# Patient Record
Sex: MALE | Race: WHITE | NOT HISPANIC OR LATINO
[De-identification: names, ages, dates, MRNs, and addresses within clinical notes are randomized per-mention and may not be internally consistent; named-entity substitution may affect disease eponyms.]

---

## 2017-05-11 ENCOUNTER — NON-APPOINTMENT (OUTPATIENT)
Age: 67
End: 2017-05-11

## 2017-05-11 ENCOUNTER — APPOINTMENT (OUTPATIENT)
Dept: CARDIOLOGY | Facility: CLINIC | Age: 67
End: 2017-05-11

## 2017-05-11 VITALS
WEIGHT: 157 LBS | BODY MASS INDEX: 24.64 KG/M2 | DIASTOLIC BLOOD PRESSURE: 86 MMHG | OXYGEN SATURATION: 99 % | HEIGHT: 67 IN | SYSTOLIC BLOOD PRESSURE: 136 MMHG | HEART RATE: 58 BPM

## 2017-05-12 ENCOUNTER — NON-APPOINTMENT (OUTPATIENT)
Age: 67
End: 2017-05-12

## 2017-06-28 ENCOUNTER — MEDICATION RENEWAL (OUTPATIENT)
Age: 67
End: 2017-06-28

## 2017-08-09 ENCOUNTER — MEDICATION RENEWAL (OUTPATIENT)
Age: 67
End: 2017-08-09

## 2017-08-09 ENCOUNTER — RX RENEWAL (OUTPATIENT)
Age: 67
End: 2017-08-09

## 2017-10-10 ENCOUNTER — MEDICATION RENEWAL (OUTPATIENT)
Age: 67
End: 2017-10-10

## 2017-11-10 ENCOUNTER — MEDICATION RENEWAL (OUTPATIENT)
Age: 67
End: 2017-11-10

## 2017-11-14 ENCOUNTER — RX RENEWAL (OUTPATIENT)
Age: 67
End: 2017-11-14

## 2017-12-01 ENCOUNTER — MEDICATION RENEWAL (OUTPATIENT)
Age: 67
End: 2017-12-01

## 2017-12-20 ENCOUNTER — MEDICATION RENEWAL (OUTPATIENT)
Age: 67
End: 2017-12-20

## 2018-02-02 ENCOUNTER — MEDICATION RENEWAL (OUTPATIENT)
Age: 68
End: 2018-02-02

## 2018-02-12 ENCOUNTER — MEDICATION RENEWAL (OUTPATIENT)
Age: 68
End: 2018-02-12

## 2018-03-05 ENCOUNTER — MEDICATION RENEWAL (OUTPATIENT)
Age: 68
End: 2018-03-05

## 2018-04-03 ENCOUNTER — MOBILE ON CALL (OUTPATIENT)
Age: 68
End: 2018-04-03

## 2018-04-03 RX ORDER — ZOLPIDEM TARTRATE 10 MG/1
10 TABLET ORAL
Qty: 30 | Refills: 0 | Status: COMPLETED | COMMUNITY
Start: 2017-10-10 | End: 2018-04-03

## 2018-05-03 ENCOUNTER — APPOINTMENT (OUTPATIENT)
Dept: CARDIOLOGY | Facility: CLINIC | Age: 68
End: 2018-05-03
Payer: MEDICARE

## 2018-05-03 ENCOUNTER — NON-APPOINTMENT (OUTPATIENT)
Age: 68
End: 2018-05-03

## 2018-05-03 VITALS
BODY MASS INDEX: 24.64 KG/M2 | HEIGHT: 67 IN | OXYGEN SATURATION: 100 % | WEIGHT: 157 LBS | SYSTOLIC BLOOD PRESSURE: 134 MMHG | DIASTOLIC BLOOD PRESSURE: 82 MMHG | HEART RATE: 65 BPM

## 2018-05-03 PROCEDURE — 99213 OFFICE O/P EST LOW 20 MIN: CPT

## 2018-05-07 ENCOUNTER — MEDICATION RENEWAL (OUTPATIENT)
Age: 68
End: 2018-05-07

## 2018-05-10 ENCOUNTER — MEDICATION RENEWAL (OUTPATIENT)
Age: 68
End: 2018-05-10

## 2018-06-05 ENCOUNTER — MEDICATION RENEWAL (OUTPATIENT)
Age: 68
End: 2018-06-05

## 2018-07-09 ENCOUNTER — MEDICATION RENEWAL (OUTPATIENT)
Age: 68
End: 2018-07-09

## 2018-08-03 ENCOUNTER — MEDICATION RENEWAL (OUTPATIENT)
Age: 68
End: 2018-08-03

## 2018-09-07 ENCOUNTER — MEDICATION RENEWAL (OUTPATIENT)
Age: 68
End: 2018-09-07

## 2018-09-17 ENCOUNTER — RX RENEWAL (OUTPATIENT)
Age: 68
End: 2018-09-17

## 2018-10-02 ENCOUNTER — RX RENEWAL (OUTPATIENT)
Age: 68
End: 2018-10-02

## 2018-10-22 ENCOUNTER — MEDICATION RENEWAL (OUTPATIENT)
Age: 68
End: 2018-10-22

## 2018-10-25 ENCOUNTER — MEDICATION RENEWAL (OUTPATIENT)
Age: 68
End: 2018-10-25

## 2018-12-03 ENCOUNTER — MEDICATION RENEWAL (OUTPATIENT)
Age: 68
End: 2018-12-03

## 2019-01-02 ENCOUNTER — RX RENEWAL (OUTPATIENT)
Age: 69
End: 2019-01-02

## 2019-02-22 ENCOUNTER — MEDICATION RENEWAL (OUTPATIENT)
Age: 69
End: 2019-02-22

## 2019-02-26 ENCOUNTER — MEDICATION RENEWAL (OUTPATIENT)
Age: 69
End: 2019-02-26

## 2019-04-12 ENCOUNTER — MEDICATION RENEWAL (OUTPATIENT)
Age: 69
End: 2019-04-12

## 2019-05-15 ENCOUNTER — RX RENEWAL (OUTPATIENT)
Age: 69
End: 2019-05-15

## 2019-06-10 ENCOUNTER — MEDICATION RENEWAL (OUTPATIENT)
Age: 69
End: 2019-06-10

## 2019-08-05 ENCOUNTER — MEDICATION RENEWAL (OUTPATIENT)
Age: 69
End: 2019-08-05

## 2019-08-15 ENCOUNTER — RX RENEWAL (OUTPATIENT)
Age: 69
End: 2019-08-15

## 2019-10-11 ENCOUNTER — MEDICATION RENEWAL (OUTPATIENT)
Age: 69
End: 2019-10-11

## 2019-11-06 ENCOUNTER — APPOINTMENT (OUTPATIENT)
Dept: CARDIOLOGY | Facility: CLINIC | Age: 69
End: 2019-11-06
Payer: MEDICARE

## 2019-11-06 ENCOUNTER — NON-APPOINTMENT (OUTPATIENT)
Age: 69
End: 2019-11-06

## 2019-11-06 VITALS
HEIGHT: 67 IN | SYSTOLIC BLOOD PRESSURE: 126 MMHG | BODY MASS INDEX: 25.9 KG/M2 | WEIGHT: 165 LBS | DIASTOLIC BLOOD PRESSURE: 80 MMHG | HEART RATE: 62 BPM | OXYGEN SATURATION: 100 %

## 2019-11-06 PROCEDURE — G0439: CPT

## 2019-11-21 ENCOUNTER — MEDICATION RENEWAL (OUTPATIENT)
Age: 69
End: 2019-11-21

## 2019-12-17 ENCOUNTER — APPOINTMENT (OUTPATIENT)
Dept: CARDIOLOGY | Facility: CLINIC | Age: 69
End: 2019-12-17

## 2020-01-02 ENCOUNTER — MEDICATION RENEWAL (OUTPATIENT)
Age: 70
End: 2020-01-02

## 2020-06-17 DIAGNOSIS — M85.80 OTHER SPECIFIED DISORDERS OF BONE DENSITY AND STRUCTURE, UNSPECIFIED SITE: ICD-10-CM

## 2020-06-17 RX ORDER — MULTIVIT-MIN/FOLIC/VIT K/LYCOP 400-300MCG
25 MCG TABLET ORAL DAILY
Qty: 30 | Refills: 0 | Status: ACTIVE | COMMUNITY
Start: 2020-06-17 | End: 1900-01-01

## 2020-08-13 ENCOUNTER — RX RENEWAL (OUTPATIENT)
Age: 70
End: 2020-08-13

## 2021-01-18 ENCOUNTER — APPOINTMENT (OUTPATIENT)
Dept: CARDIOLOGY | Facility: CLINIC | Age: 71
End: 2021-01-18
Payer: MEDICARE

## 2021-01-18 ENCOUNTER — NON-APPOINTMENT (OUTPATIENT)
Age: 71
End: 2021-01-18

## 2021-01-18 VITALS
DIASTOLIC BLOOD PRESSURE: 86 MMHG | HEART RATE: 57 BPM | BODY MASS INDEX: 25.58 KG/M2 | TEMPERATURE: 97.2 F | SYSTOLIC BLOOD PRESSURE: 140 MMHG | WEIGHT: 163 LBS | HEIGHT: 67 IN | OXYGEN SATURATION: 100 %

## 2021-01-18 DIAGNOSIS — F41.9 ANXIETY DISORDER, UNSPECIFIED: ICD-10-CM

## 2021-01-18 PROCEDURE — G0439: CPT

## 2021-01-18 PROCEDURE — 99214 OFFICE O/P EST MOD 30 MIN: CPT

## 2021-01-18 PROCEDURE — 36415 COLL VENOUS BLD VENIPUNCTURE: CPT

## 2021-01-18 PROCEDURE — 93000 ELECTROCARDIOGRAM COMPLETE: CPT

## 2021-01-18 PROCEDURE — 99397 PER PM REEVAL EST PAT 65+ YR: CPT

## 2021-01-18 RX ORDER — KETOCONAZOLE 20.5 MG/ML
2 SHAMPOO, SUSPENSION TOPICAL
Qty: 120 | Refills: 0 | Status: COMPLETED | COMMUNITY
Start: 2020-09-29

## 2021-02-12 ENCOUNTER — RX RENEWAL (OUTPATIENT)
Age: 71
End: 2021-02-12

## 2021-08-12 ENCOUNTER — RX RENEWAL (OUTPATIENT)
Age: 71
End: 2021-08-12

## 2021-09-09 DIAGNOSIS — R10.2 PELVIC AND PERINEAL PAIN: ICD-10-CM

## 2021-09-20 ENCOUNTER — APPOINTMENT (OUTPATIENT)
Dept: SURGERY | Facility: CLINIC | Age: 71
End: 2021-09-20
Payer: MEDICARE

## 2021-09-20 VITALS
TEMPERATURE: 97.2 F | SYSTOLIC BLOOD PRESSURE: 167 MMHG | DIASTOLIC BLOOD PRESSURE: 94 MMHG | HEART RATE: 54 BPM | WEIGHT: 165 LBS | BODY MASS INDEX: 25.9 KG/M2 | HEIGHT: 67 IN

## 2021-09-20 DIAGNOSIS — K40.90 UNILATERAL INGUINAL HERNIA, W/OUT OBSTRUCTION OR GANGRENE, NOT SPECIFIED AS RECURRENT: ICD-10-CM

## 2021-09-20 PROCEDURE — 99203 OFFICE O/P NEW LOW 30 MIN: CPT

## 2021-09-20 NOTE — PHYSICAL EXAM
[Respiratory Effort] : normal respiratory effort [Alert] : alert [Oriented to Person] : oriented to person [Oriented to Place] : oriented to place [Oriented to Time] : oriented to time [Calm] : calm [JVD] : no jugular venous distention  [Abdominal Masses] : No abdominal masses [Abdomen Tenderness] : ~T ~M No abdominal tenderness [de-identified] : PROSPER ANDRADE NAD [de-identified] : EOMI [de-identified] : soft NT ND + reducible LIH.

## 2021-09-20 NOTE — HISTORY OF PRESENT ILLNESS
[de-identified] : 72 yo male with h/o open RIH repair by Dr. Hogan 20 + years ago presents today with 1 month h/o intermittent left groin burning and discomfort. He states he notices increased burning after long walks with his dog. He denies any nausea or vomiting.

## 2021-10-07 ENCOUNTER — OUTPATIENT (OUTPATIENT)
Dept: OUTPATIENT SERVICES | Facility: HOSPITAL | Age: 71
LOS: 1 days | Discharge: HOME | End: 2021-10-07
Payer: MEDICARE

## 2021-10-07 VITALS
TEMPERATURE: 98 F | WEIGHT: 162.48 LBS | SYSTOLIC BLOOD PRESSURE: 150 MMHG | DIASTOLIC BLOOD PRESSURE: 60 MMHG | HEIGHT: 67 IN | HEART RATE: 55 BPM

## 2021-10-07 DIAGNOSIS — K40.90 UNILATERAL INGUINAL HERNIA, WITHOUT OBSTRUCTION OR GANGRENE, NOT SPECIFIED AS RECURRENT: Chronic | ICD-10-CM

## 2021-10-07 DIAGNOSIS — K40.90 UNILATERAL INGUINAL HERNIA, WITHOUT OBSTRUCTION OR GANGRENE, NOT SPECIFIED AS RECURRENT: ICD-10-CM

## 2021-10-07 DIAGNOSIS — Z01.818 ENCOUNTER FOR OTHER PREPROCEDURAL EXAMINATION: ICD-10-CM

## 2021-10-07 LAB
ANION GAP SERPL CALC-SCNC: 4 MMOL/L — LOW (ref 5–17)
BUN SERPL-MCNC: 18 MG/DL — SIGNIFICANT CHANGE UP (ref 7–23)
CALCIUM SERPL-MCNC: 9.9 MG/DL — SIGNIFICANT CHANGE UP (ref 8.5–10.1)
CHLORIDE SERPL-SCNC: 106 MMOL/L — SIGNIFICANT CHANGE UP (ref 96–108)
CO2 SERPL-SCNC: 29 MMOL/L — SIGNIFICANT CHANGE UP (ref 22–31)
CREAT SERPL-MCNC: 0.75 MG/DL — SIGNIFICANT CHANGE UP (ref 0.5–1.3)
GLUCOSE SERPL-MCNC: 83 MG/DL — SIGNIFICANT CHANGE UP (ref 70–99)
HCT VFR BLD CALC: 41.5 % — SIGNIFICANT CHANGE UP (ref 39–50)
HGB BLD-MCNC: 13.5 G/DL — SIGNIFICANT CHANGE UP (ref 13–17)
MCHC RBC-ENTMCNC: 29.6 PG — SIGNIFICANT CHANGE UP (ref 27–34)
MCHC RBC-ENTMCNC: 32.5 GM/DL — SIGNIFICANT CHANGE UP (ref 32–36)
MCV RBC AUTO: 91 FL — SIGNIFICANT CHANGE UP (ref 80–100)
NRBC # BLD: 0 /100 WBCS — SIGNIFICANT CHANGE UP (ref 0–0)
PLATELET # BLD AUTO: 225 K/UL — SIGNIFICANT CHANGE UP (ref 150–400)
POTASSIUM SERPL-MCNC: 4.4 MMOL/L — SIGNIFICANT CHANGE UP (ref 3.5–5.3)
POTASSIUM SERPL-SCNC: 4.4 MMOL/L — SIGNIFICANT CHANGE UP (ref 3.5–5.3)
RBC # BLD: 4.56 M/UL — SIGNIFICANT CHANGE UP (ref 4.2–5.8)
RBC # FLD: 12.6 % — SIGNIFICANT CHANGE UP (ref 10.3–14.5)
SODIUM SERPL-SCNC: 139 MMOL/L — SIGNIFICANT CHANGE UP (ref 135–145)
WBC # BLD: 6.5 K/UL — SIGNIFICANT CHANGE UP (ref 3.8–10.5)
WBC # FLD AUTO: 6.5 K/UL — SIGNIFICANT CHANGE UP (ref 3.8–10.5)

## 2021-10-07 PROCEDURE — 93010 ELECTROCARDIOGRAM REPORT: CPT

## 2021-10-07 NOTE — H&P PST ADULT - NSANTHOSAYNRD_GEN_A_CORE
No. MICHEL screening performed.  STOP BANG Legend: 0-2 = LOW Risk; 3-4 = INTERMEDIATE Risk; 5-8 = HIGH Risk

## 2021-10-07 NOTE — H&P PST ADULT - ASSESSMENT
71 year old male with a past medical history of HLD c/o discomfort to left groin that worsens at night.  He is scheduled for a laparoscopic left  inguinal hernia repair on 10/14/2021.    CAPRINI SCORE [CLOT]    AGE RELATED RISK FACTORS                                                       MOBILITY RELATED FACTORS  [ ] Age 41-60 years                                            (1 Point)                  [ ] Bed rest                                                        (1 Point)  [x ] Age: 61-74 years                                           (2 Points)                 [ ] Plaster cast                                                   (2 Points)  [ ] Age= 75 years                                              (3 Points)                 [ ] Bed bound for more than 72 hours                 (2 Points)    DISEASE RELATED RISK FACTORS                                               GENDER SPECIFIC FACTORS  [ ] Edema in the lower extremities                       (1 Point)                  [ ] Pregnancy                                                     (1 Point)  [ ] Varicose veins                                               (1 Point)                  [ ] Post-partum < 6 weeks                                   (1 Point)             [ ] BMI > 25 Kg/m2                                            (1 Point)                  [ ] Hormonal therapy  or oral contraception          (1 Point)                 [ ] Sepsis (in the previous month)                        (1 Point)                  [ ] History of pregnancy complications                 (1 point)  [ ] Pneumonia or serious lung disease                                               [ ] Unexplained or recurrent                     (1 Point)           (in the previous month)                               (1 Point)  [ ] Abnormal pulmonary function test                     (1 Point)                 SURGERY RELATED RISK FACTORS  [ ] Acute myocardial infarction                              (1 Point)                 [ ]  Section                                             (1 Point)  [ ] Congestive heart failure (in the previous month)  (1 Point)               [ ] Minor surgery                                                  (1 Point)   [ ] Inflammatory bowel disease                             (1 Point)                 [ ] Arthroscopic surgery                                        (2 Points)  [ ] Central venous access                                      (2 Points)                [x ] General surgery lasting more than 45 minutes   (2 Points)       [ ] Stroke (in the previous month)                          (5 Points)               [ ] Elective arthroplasty                                         (5 Points)                                                                                                                                               HEMATOLOGY RELATED FACTORS                                                 TRAUMA RELATED RISK FACTORS  [ ] Prior episodes of VTE                                     (3 Points)                [ ] Fracture of the hip, pelvis, or leg                       (5 Points)  [ ] Positive family history for VTE                         (3 Points)                 [ ] Acute spinal cord injury (in the previous month)  (5 Points)  [ ] Prothrombin 25578 A                                     (3 Points)                 [ ] Paralysis  (less than 1 month)                             (5 Points)  [ ] Factor V Leiden                                             (3 Points)                  [ ] Multiple Trauma within 1 month                        (5 Points)  [ ] Lupus anticoagulants                                     (3 Points)                                                           [ ] Anticardiolipin antibodies                               (3 Points)                                                       [ ] High homocysteine in the blood                      (3 Points)                                             [ ] Other congenital or acquired thrombophilia      (3 Points)                                                [ ] Heparin induced thrombocytopenia                  (3 Points)                                          Total Score [    4      ]    Caprini Score 0 - 2:  Low Risk, No VTE Prophylaxis required for most patients, encourage ambulation  Caprini Score 3 - 6:  At Risk, pharmacologic VTE prophylaxis is indicated for most patients (in the absence of a contraindication)  Caprini Score Greater than or = 7:  High Risk, pharmacologic VTE prophylaxis is indicated for most patients (in the absence of a contraindication)

## 2021-10-07 NOTE — H&P PST ADULT - HISTORY OF PRESENT ILLNESS
71 year old male with a past medical history of HLD c/o discomfort to left groin that worsens at night.  He is scheduled for a laparoscopic left i inguinal hernia repair on 10/14/2021.    He denies fever, cough, SOB, recent travels, and sick contacts.

## 2021-10-11 ENCOUNTER — TRANSCRIPTION ENCOUNTER (OUTPATIENT)
Age: 71
End: 2021-10-11

## 2021-10-13 ENCOUNTER — TRANSCRIPTION ENCOUNTER (OUTPATIENT)
Age: 71
End: 2021-10-13

## 2021-10-14 ENCOUNTER — APPOINTMENT (OUTPATIENT)
Dept: SURGERY | Facility: HOSPITAL | Age: 71
End: 2021-10-14

## 2021-10-14 ENCOUNTER — OUTPATIENT (OUTPATIENT)
Dept: OUTPATIENT SERVICES | Facility: HOSPITAL | Age: 71
LOS: 1 days | Discharge: HOME | End: 2021-10-14
Payer: MEDICARE

## 2021-10-14 VITALS
HEART RATE: 88 BPM | WEIGHT: 162.48 LBS | SYSTOLIC BLOOD PRESSURE: 150 MMHG | RESPIRATION RATE: 14 BRPM | OXYGEN SATURATION: 97 % | TEMPERATURE: 98 F | HEIGHT: 67 IN | DIASTOLIC BLOOD PRESSURE: 87 MMHG

## 2021-10-14 VITALS
TEMPERATURE: 98 F | SYSTOLIC BLOOD PRESSURE: 139 MMHG | OXYGEN SATURATION: 98 % | RESPIRATION RATE: 16 BRPM | HEART RATE: 52 BPM | DIASTOLIC BLOOD PRESSURE: 71 MMHG

## 2021-10-14 DIAGNOSIS — K40.90 UNILATERAL INGUINAL HERNIA, WITHOUT OBSTRUCTION OR GANGRENE, NOT SPECIFIED AS RECURRENT: Chronic | ICD-10-CM

## 2021-10-14 PROCEDURE — 49650 LAP ING HERNIA REPAIR INIT: CPT | Mod: AS

## 2021-10-14 PROCEDURE — 49650 LAP ING HERNIA REPAIR INIT: CPT

## 2021-10-14 RX ORDER — FENTANYL CITRATE 50 UG/ML
25 INJECTION INTRAVENOUS
Refills: 0 | Status: DISCONTINUED | OUTPATIENT
Start: 2021-10-14 | End: 2021-10-14

## 2021-10-14 RX ORDER — SODIUM CHLORIDE 9 MG/ML
3 INJECTION INTRAMUSCULAR; INTRAVENOUS; SUBCUTANEOUS EVERY 8 HOURS
Refills: 0 | Status: DISCONTINUED | OUTPATIENT
Start: 2021-10-14 | End: 2021-10-14

## 2021-10-14 RX ORDER — ATORVASTATIN CALCIUM 80 MG/1
1 TABLET, FILM COATED ORAL
Qty: 0 | Refills: 0 | DISCHARGE

## 2021-10-14 RX ORDER — SODIUM CHLORIDE 9 MG/ML
1000 INJECTION, SOLUTION INTRAVENOUS
Refills: 0 | Status: DISCONTINUED | OUTPATIENT
Start: 2021-10-14 | End: 2021-10-14

## 2021-10-14 RX ORDER — HYDROMORPHONE HYDROCHLORIDE 2 MG/ML
0.5 INJECTION INTRAMUSCULAR; INTRAVENOUS; SUBCUTANEOUS
Refills: 0 | Status: DISCONTINUED | OUTPATIENT
Start: 2021-10-14 | End: 2021-10-14

## 2021-10-14 RX ADMIN — SODIUM CHLORIDE 75 MILLILITER(S): 9 INJECTION, SOLUTION INTRAVENOUS at 10:36

## 2021-10-14 NOTE — BRIEF OPERATIVE NOTE - NSICDXBRIEFPROCEDURE_GEN_ALL_CORE_FT
PROCEDURES:  Laparoscopic herniorrhaphy of left inguinal hernia by total extraperitoneal approach 14-Oct-2021 10:24:59  Mynor Frias

## 2021-10-15 PROBLEM — E78.5 HYPERLIPIDEMIA, UNSPECIFIED: Chronic | Status: ACTIVE | Noted: 2021-10-07

## 2021-10-20 ENCOUNTER — APPOINTMENT (OUTPATIENT)
Dept: SURGERY | Facility: CLINIC | Age: 71
End: 2021-10-20
Payer: MEDICARE

## 2021-10-20 VITALS
BODY MASS INDEX: 25.9 KG/M2 | HEIGHT: 67 IN | TEMPERATURE: 97.2 F | WEIGHT: 165 LBS | SYSTOLIC BLOOD PRESSURE: 124 MMHG | HEART RATE: 54 BPM | DIASTOLIC BLOOD PRESSURE: 81 MMHG | RESPIRATION RATE: 16 BRPM | OXYGEN SATURATION: 99 %

## 2021-10-20 DIAGNOSIS — E78.5 HYPERLIPIDEMIA, UNSPECIFIED: ICD-10-CM

## 2021-10-20 DIAGNOSIS — R10.32 LEFT LOWER QUADRANT PAIN: ICD-10-CM

## 2021-10-20 DIAGNOSIS — Z09 ENCOUNTER FOR FOLLOW-UP EXAMINATION AFTER COMPLETED TREATMENT FOR CONDITIONS OTHER THAN MALIGNANT NEOPLASM: ICD-10-CM

## 2021-10-20 DIAGNOSIS — K40.90 UNILATERAL INGUINAL HERNIA, WITHOUT OBSTRUCTION OR GANGRENE, NOT SPECIFIED AS RECURRENT: ICD-10-CM

## 2021-10-20 PROCEDURE — 99024 POSTOP FOLLOW-UP VISIT: CPT

## 2021-10-20 NOTE — ASSESSMENT
[FreeTextEntry1] : patient is feeling well, he has been walking 3 miles daily\par \par \par \par incisions are c/d/i and healing well\par \par \par \par f/u prn

## 2022-02-07 ENCOUNTER — APPOINTMENT (OUTPATIENT)
Dept: CARDIOLOGY | Facility: CLINIC | Age: 72
End: 2022-02-07

## 2022-02-08 ENCOUNTER — RX RENEWAL (OUTPATIENT)
Age: 72
End: 2022-02-08

## 2022-03-18 ENCOUNTER — APPOINTMENT (OUTPATIENT)
Dept: CARDIOLOGY | Facility: CLINIC | Age: 72
End: 2022-03-18
Payer: MEDICARE

## 2022-03-18 ENCOUNTER — NON-APPOINTMENT (OUTPATIENT)
Age: 72
End: 2022-03-18

## 2022-03-18 VITALS
OXYGEN SATURATION: 100 % | SYSTOLIC BLOOD PRESSURE: 150 MMHG | HEART RATE: 56 BPM | DIASTOLIC BLOOD PRESSURE: 98 MMHG | BODY MASS INDEX: 24.75 KG/M2 | WEIGHT: 158 LBS

## 2022-03-18 DIAGNOSIS — R25.3 FASCICULATION: ICD-10-CM

## 2022-03-18 PROCEDURE — 99214 OFFICE O/P EST MOD 30 MIN: CPT

## 2022-03-18 RX ORDER — ZOLPIDEM TARTRATE 10 MG/1
10 TABLET ORAL
Qty: 30 | Refills: 5 | Status: DISCONTINUED | COMMUNITY
Start: 2017-10-10 | End: 2022-03-18

## 2022-03-19 LAB
ALBUMIN SERPL ELPH-MCNC: 5.1 G/DL
ALP BLD-CCNC: 63 U/L
ALT SERPL-CCNC: 20 U/L
ANION GAP SERPL CALC-SCNC: 13 MMOL/L
AST SERPL-CCNC: 21 U/L
BASOPHILS # BLD AUTO: 0.05 K/UL
BASOPHILS NFR BLD AUTO: 0.7 %
BILIRUB SERPL-MCNC: 0.9 MG/DL
BUN SERPL-MCNC: 19 MG/DL
CALCIUM SERPL-MCNC: 10.3 MG/DL
CHLORIDE SERPL-SCNC: 103 MMOL/L
CHOLEST SERPL-MCNC: 147 MG/DL
CO2 SERPL-SCNC: 25 MMOL/L
CREAT SERPL-MCNC: 0.93 MG/DL
EGFR: 88 ML/MIN/1.73M2
EOSINOPHIL # BLD AUTO: 0.12 K/UL
EOSINOPHIL NFR BLD AUTO: 1.6 %
ESTIMATED AVERAGE GLUCOSE: 108 MG/DL
GLUCOSE SERPL-MCNC: 99 MG/DL
HBA1C MFR BLD HPLC: 5.4 %
HCT VFR BLD CALC: 43 %
HDLC SERPL-MCNC: 60 MG/DL
HGB BLD-MCNC: 14.2 G/DL
IMM GRANULOCYTES NFR BLD AUTO: 0.3 %
LDLC SERPL CALC-MCNC: 69 MG/DL
LYMPHOCYTES # BLD AUTO: 1.87 K/UL
LYMPHOCYTES NFR BLD AUTO: 24.8 %
MAN DIFF?: NORMAL
MCHC RBC-ENTMCNC: 30.1 PG
MCHC RBC-ENTMCNC: 33 GM/DL
MCV RBC AUTO: 91.1 FL
MONOCYTES # BLD AUTO: 0.54 K/UL
MONOCYTES NFR BLD AUTO: 7.2 %
NEUTROPHILS # BLD AUTO: 4.94 K/UL
NEUTROPHILS NFR BLD AUTO: 65.4 %
NONHDLC SERPL-MCNC: 87 MG/DL
PLATELET # BLD AUTO: 228 K/UL
POTASSIUM SERPL-SCNC: 4.9 MMOL/L
PROT SERPL-MCNC: 7.3 G/DL
PSA FREE FLD-MCNC: 28 %
PSA FREE SERPL-MCNC: 0.35 NG/ML
PSA SERPL-MCNC: 1.24 NG/ML
RBC # BLD: 4.72 M/UL
RBC # FLD: 12.7 %
SODIUM SERPL-SCNC: 141 MMOL/L
T4 SERPL-MCNC: 4.5 UG/DL
TRIGL SERPL-MCNC: 88 MG/DL
TSH SERPL-ACNC: 1.75 UIU/ML
WBC # FLD AUTO: 7.54 K/UL

## 2022-04-15 ENCOUNTER — NON-APPOINTMENT (OUTPATIENT)
Age: 72
End: 2022-04-15

## 2022-04-15 DIAGNOSIS — R42 DIZZINESS AND GIDDINESS: ICD-10-CM

## 2022-08-06 ENCOUNTER — RX RENEWAL (OUTPATIENT)
Age: 72
End: 2022-08-06

## 2022-08-27 ENCOUNTER — NON-APPOINTMENT (OUTPATIENT)
Age: 72
End: 2022-08-27

## 2022-09-08 ENCOUNTER — NON-APPOINTMENT (OUTPATIENT)
Age: 72
End: 2022-09-08

## 2023-02-08 ENCOUNTER — RX RENEWAL (OUTPATIENT)
Age: 73
End: 2023-02-08

## 2023-03-30 ENCOUNTER — APPOINTMENT (OUTPATIENT)
Dept: CARDIOLOGY | Facility: CLINIC | Age: 73
End: 2023-03-30
Payer: MEDICARE

## 2023-03-30 ENCOUNTER — NON-APPOINTMENT (OUTPATIENT)
Age: 73
End: 2023-03-30

## 2023-03-30 VITALS
BODY MASS INDEX: 24.48 KG/M2 | DIASTOLIC BLOOD PRESSURE: 80 MMHG | WEIGHT: 156 LBS | HEIGHT: 67 IN | SYSTOLIC BLOOD PRESSURE: 146 MMHG | OXYGEN SATURATION: 97 %

## 2023-03-30 DIAGNOSIS — I73.00 RAYNAUD'S SYNDROME W/OUT GANGRENE: ICD-10-CM

## 2023-03-30 DIAGNOSIS — R25.1 TREMOR, UNSPECIFIED: ICD-10-CM

## 2023-03-30 DIAGNOSIS — R00.2 PALPITATIONS: ICD-10-CM

## 2023-03-30 DIAGNOSIS — E78.5 HYPERLIPIDEMIA, UNSPECIFIED: ICD-10-CM

## 2023-03-30 PROCEDURE — 99214 OFFICE O/P EST MOD 30 MIN: CPT

## 2023-03-31 LAB
ALBUMIN SERPL ELPH-MCNC: 4.9 G/DL
ALP BLD-CCNC: 101 U/L
ALT SERPL-CCNC: 14 U/L
ANION GAP SERPL CALC-SCNC: 12 MMOL/L
AST SERPL-CCNC: 19 U/L
BASOPHILS # BLD AUTO: 0.04 K/UL
BASOPHILS NFR BLD AUTO: 0.5 %
BILIRUB SERPL-MCNC: 0.3 MG/DL
BUN SERPL-MCNC: 18 MG/DL
CALCIUM SERPL-MCNC: 10.4 MG/DL
CHLORIDE SERPL-SCNC: 102 MMOL/L
CHOLEST SERPL-MCNC: 149 MG/DL
CO2 SERPL-SCNC: 26 MMOL/L
CREAT SERPL-MCNC: 0.93 MG/DL
EGFR: 87 ML/MIN/1.73M2
EOSINOPHIL # BLD AUTO: 0.17 K/UL
EOSINOPHIL NFR BLD AUTO: 2 %
ESTIMATED AVERAGE GLUCOSE: 111 MG/DL
GLUCOSE SERPL-MCNC: 96 MG/DL
HBA1C MFR BLD HPLC: 5.5 %
HCT VFR BLD CALC: 43.5 %
HDLC SERPL-MCNC: 65 MG/DL
HGB BLD-MCNC: 14 G/DL
IMM GRANULOCYTES NFR BLD AUTO: 0.6 %
LDLC SERPL CALC-MCNC: 71 MG/DL
LYMPHOCYTES # BLD AUTO: 1.64 K/UL
LYMPHOCYTES NFR BLD AUTO: 19.6 %
MAN DIFF?: NORMAL
MCHC RBC-ENTMCNC: 29.3 PG
MCHC RBC-ENTMCNC: 32.2 GM/DL
MCV RBC AUTO: 91 FL
MONOCYTES # BLD AUTO: 0.54 K/UL
MONOCYTES NFR BLD AUTO: 6.5 %
NEUTROPHILS # BLD AUTO: 5.92 K/UL
NEUTROPHILS NFR BLD AUTO: 70.8 %
NONHDLC SERPL-MCNC: 84 MG/DL
PLATELET # BLD AUTO: 276 K/UL
POTASSIUM SERPL-SCNC: 5.2 MMOL/L
PROT SERPL-MCNC: 7.7 G/DL
PSA FREE FLD-MCNC: 22 %
PSA FREE SERPL-MCNC: 0.35 NG/ML
PSA SERPL-MCNC: 1.6 NG/ML
RBC # BLD: 4.78 M/UL
RBC # FLD: 12.9 %
SODIUM SERPL-SCNC: 140 MMOL/L
T4 SERPL-MCNC: 4.9 UG/DL
TRIGL SERPL-MCNC: 64 MG/DL
TSH SERPL-ACNC: 1.56 UIU/ML
WBC # FLD AUTO: 8.36 K/UL

## 2023-08-07 ENCOUNTER — RX RENEWAL (OUTPATIENT)
Age: 73
End: 2023-08-07

## 2024-01-09 ENCOUNTER — APPOINTMENT (OUTPATIENT)
Dept: NEUROLOGY | Facility: CLINIC | Age: 74
End: 2024-01-09

## 2024-02-01 ENCOUNTER — RX RENEWAL (OUTPATIENT)
Age: 74
End: 2024-02-01

## 2024-03-14 ENCOUNTER — APPOINTMENT (OUTPATIENT)
Dept: CARDIOLOGY | Facility: CLINIC | Age: 74
End: 2024-03-14
Payer: MEDICARE

## 2024-03-14 ENCOUNTER — NON-APPOINTMENT (OUTPATIENT)
Age: 74
End: 2024-03-14

## 2024-03-14 VITALS
DIASTOLIC BLOOD PRESSURE: 80 MMHG | OXYGEN SATURATION: 99 % | BODY MASS INDEX: 24.59 KG/M2 | WEIGHT: 157 LBS | SYSTOLIC BLOOD PRESSURE: 130 MMHG | HEART RATE: 63 BPM

## 2024-03-14 DIAGNOSIS — I10 ESSENTIAL (PRIMARY) HYPERTENSION: ICD-10-CM

## 2024-03-14 DIAGNOSIS — Z00.00 ENCOUNTER FOR GENERAL ADULT MEDICAL EXAMINATION W/OUT ABNORMAL FINDINGS: ICD-10-CM

## 2024-03-14 DIAGNOSIS — G20.A2 PARKINSON'S DISEASE WITHOUT DYSKINESIA, WITH FLUCTUATIONS: ICD-10-CM

## 2024-03-14 PROCEDURE — 99214 OFFICE O/P EST MOD 30 MIN: CPT

## 2024-03-14 PROCEDURE — 93000 ELECTROCARDIOGRAM COMPLETE: CPT

## 2024-03-14 PROCEDURE — G2211 COMPLEX E/M VISIT ADD ON: CPT

## 2024-03-14 NOTE — DISCUSSION/SUMMARY
[FreeTextEntry1] : Hypertension-well-controlled Parkinson disease-clinically stable and very mild Health maintenance-patient has colonoscopy recently.  Copies of the records were forwarded here. Continue current regimen Return visit 1 year   Total time of the encounter: 30 minutes which included but was not limited to the following: Face-to-face and non face-to-face time personally spent by the physician preparing to see the patient, obtaining and/or resuming separately obtained history, performing a medically appropriate examination and/or evaluation, counseling and educating the patient/family/caregiver, ordering medications, tests or procedures, referring and communicating with other healthcare professionals, documenting clinical information in the electronic health record, independently interpreting results and communicated results to the patient/family/caregiver and care coordination. [EKG obtained to assist in diagnosis and management of assessed problem(s)] : EKG obtained to assist in diagnosis and management of assessed problem(s)

## 2024-03-14 NOTE — REASON FOR VISIT
[FreeTextEntry1] : The patient is here today for follow-up of hypertension and Parkinson's disease.  The patient lives at [facility].  Last year, he was diagnosed with Parkinson's disease but has continued to function extremely well.  He plays pickle ball regularly and walks his dog for 2 miles every day with no difficulties.  He is currently on Azilect but no Sinemet.  He feels there is no difference in his clinical situation over the past year.

## 2024-09-18 ENCOUNTER — NON-APPOINTMENT (OUTPATIENT)
Age: 74
End: 2024-09-18

## 2024-11-08 ENCOUNTER — APPOINTMENT (OUTPATIENT)
Dept: NEUROLOGY | Facility: CLINIC | Age: 74
End: 2024-11-08
Payer: MEDICARE

## 2024-11-08 VITALS — HEIGHT: 67 IN | WEIGHT: 150 LBS | BODY MASS INDEX: 23.54 KG/M2

## 2024-11-08 PROCEDURE — 99205 OFFICE O/P NEW HI 60 MIN: CPT

## 2024-11-08 RX ORDER — RASAGILINE 1 MG/1
1 TABLET ORAL DAILY
Qty: 90 | Refills: 3 | Status: ACTIVE | COMMUNITY
Start: 1900-01-01 | End: 1900-01-01

## 2024-12-27 ENCOUNTER — RX RENEWAL (OUTPATIENT)
Age: 74
End: 2024-12-27

## 2025-01-06 ENCOUNTER — NON-APPOINTMENT (OUTPATIENT)
Age: 75
End: 2025-01-06

## 2025-01-07 ENCOUNTER — DOCTOR'S OFFICE (OUTPATIENT)
Facility: LOCATION | Age: 75
Setting detail: OPHTHALMOLOGY
End: 2025-01-07
Payer: MEDICARE

## 2025-01-07 DIAGNOSIS — H35.40: ICD-10-CM

## 2025-01-07 DIAGNOSIS — H43.813: ICD-10-CM

## 2025-01-07 DIAGNOSIS — H43.393: ICD-10-CM

## 2025-01-07 DIAGNOSIS — H25.13: ICD-10-CM

## 2025-01-07 DIAGNOSIS — Q14.8: ICD-10-CM

## 2025-01-07 PROCEDURE — 92201 OPSCPY EXTND RTA DRAW UNI/BI: CPT | Performed by: OPHTHALMOLOGY

## 2025-01-07 PROCEDURE — 92134 CPTRZ OPH DX IMG PST SGM RTA: CPT | Performed by: OPHTHALMOLOGY

## 2025-01-07 PROCEDURE — 92014 COMPRE OPH EXAM EST PT 1/>: CPT | Performed by: OPHTHALMOLOGY

## 2025-01-07 ASSESSMENT — CONFRONTATIONAL VISUAL FIELD TEST (CVF)
OS_FINDINGS: FULL
OD_FINDINGS: FULL

## 2025-01-08 PROBLEM — H35.40 PERIPAPILLARY ATROPHY ; BOTH EYES: Status: ACTIVE | Noted: 2025-01-07

## 2025-01-08 ASSESSMENT — REFRACTION_CURRENTRX
OS_ADD: +2.75
OS_OVR_VA: 20/
OD_AXIS: 177
OD_SPHERE: -2.75
OS_CYLINDER: +1.00
OS_ADD: +2.50
OS_AXIS: 071
OD_ADD: +2.50
OS_OVR_VA: 20/
OD_ADD: +2.75
OD_VPRISM_DIRECTION: PROGS
OS_VPRISM_DIRECTION: PROGS
OD_CYLINDER: +1.25
OD_OVR_VA: 20/
OS_SPHERE: -2.00
OS_AXIS: 172
OS_SPHERE: -2.75
OD_OVR_VA: 20/
OD_AXIS: 091
OD_CYLINDER: -1.25
OS_CYLINDER: -1.00
OD_SPHERE: -1.50

## 2025-01-08 ASSESSMENT — VISUAL ACUITY
OS_BCVA: 20/20
OD_BCVA: 20/20

## 2025-01-08 ASSESSMENT — REFRACTION_MANIFEST
OD_AXIS: 090
OS_AXIS: 080
OS_VA1: 20/20
OD_VA1: 20/20
OU_VA: 20/20
OD_AXIS: 090
OD_SPHERE: -1.00
OD_ADD: +2.75
OD_ADD: +2.75
OS_SPHERE: -2.00
OS_CYLINDER: -1.00
OU_VA: 20/20
OS_VA1: 20/20
OD_SPHERE: -1.00
OD_VA1: 20/20
OS_ADD: +2.75
OD_CYLINDER: -1.25
OS_SPHERE: -2.00
OS_ADD: +2.75
OD_CYLINDER: -1.25
OS_AXIS: 080
OS_CYLINDER: -1.00

## 2025-01-08 ASSESSMENT — REFRACTION_AUTOREFRACTION
OD_SPHERE: --2.25
OS_CYLINDER: +1.00
OD_CYLINDER: +1.50
OD_AXIS: 175
OS_AXIS: 171
OS_SPHERE: -2.75

## 2025-01-08 ASSESSMENT — KERATOMETRY
OD_K1POWER_DIOPTERS: 43.50
OS_AXISANGLE_DEGREES: 169
OS_K2POWER_DIOPTERS: 44.00
OS_K1POWER_DIOPTERS: 43.25
OD_K2POWER_DIOPTERS: 43.75
OD_AXISANGLE_DEGREES: 005

## 2025-01-22 ENCOUNTER — RX RENEWAL (OUTPATIENT)
Age: 75
End: 2025-01-22

## 2025-02-17 ENCOUNTER — OFFICE (OUTPATIENT)
Dept: URBAN - METROPOLITAN AREA CLINIC 8 | Facility: CLINIC | Age: 75
Setting detail: OPHTHALMOLOGY
End: 2025-02-17
Payer: MEDICARE

## 2025-02-17 DIAGNOSIS — H25.13: ICD-10-CM

## 2025-02-17 DIAGNOSIS — H52.4: ICD-10-CM

## 2025-02-17 PROCEDURE — 92015 DETERMINE REFRACTIVE STATE: CPT | Performed by: OPHTHALMOLOGY

## 2025-02-17 PROCEDURE — 99213 OFFICE O/P EST LOW 20 MIN: CPT | Performed by: OPHTHALMOLOGY

## 2025-02-17 ASSESSMENT — REFRACTION_MANIFEST
OS_CYLINDER: -1.00
OD_ADD: +2.75
OD_AXIS: 090
OD_SPHERE: -1.00
OS_AXIS: 080
OD_CYLINDER: -1.25
OS_SPHERE: -2.00
OS_SPHERE: -2.00
OS_VA2: 20/25
OU_VA: 20/20
OD_SPHERE: -1.00
OD_ADD: +3.00
OD_VA2: 20/25
OD_AXIS: 090
OU_VA: 20/20
OD_VA1: 20/20
OS_ADD: +2.75
OS_VA1: 20/20
OS_VA1: 20/20
OS_VA2: 20/25
OS_CYLINDER: -1.00
OD_CYLINDER: -1.25
OD_VA2: 20/25
OS_AXIS: 080
OD_VA1: 20/20
OS_ADD: +3.00

## 2025-02-17 ASSESSMENT — REFRACTION_CURRENTRX
OD_CYLINDER: +1.25
OD_ADD: +2.50
OS_ADD: +2.50
OS_CYLINDER: +1.00
OS_SPHERE: -2.00
OS_VPRISM_DIRECTION: PROGS
OS_ADD: +2.75
OS_AXIS: 172
OD_SPHERE: -2.75
OD_OVR_VA: 20/
OS_OVR_VA: 20/
OD_ADD: +2.75
OD_AXIS: 086
OD_VPRISM_DIRECTION: PROGS
OS_CYLINDER: -1.25
OD_AXIS: 177
OD_CYLINDER: -1.25
OD_OVR_VA: 20/
OS_AXIS: 084
OS_SPHERE: -2.75
OS_OVR_VA: 20/
OD_SPHERE: -1.00

## 2025-02-17 ASSESSMENT — CONFRONTATIONAL VISUAL FIELD TEST (CVF)
OD_FINDINGS: FULL
OS_FINDINGS: FULL

## 2025-02-17 ASSESSMENT — REFRACTION_AUTOREFRACTION
OD_CYLINDER: -1.75
OS_SPHERE: -0.50
OD_SPHERE: +0.25
OD_AXIS: 092
OS_AXIS: 076
OS_CYLINDER: -1.50

## 2025-02-17 ASSESSMENT — KERATOMETRY
OD_K1POWER_DIOPTERS: 43.00
OD_K2POWER_DIOPTERS: 43.75
OS_AXISANGLE_DEGREES: 090
OS_K1POWER_DIOPTERS: 43.50
OS_K2POWER_DIOPTERS: 43.50
OD_AXISANGLE_DEGREES: 016

## 2025-02-17 ASSESSMENT — VISUAL ACUITY
OD_BCVA: 20/20
OS_BCVA: 20/20

## 2025-02-27 NOTE — ASU PATIENT PROFILE, ADULT - ABILITY TO HEAR (WITH HEARING AID OR HEARING APPLIANCE IF NORMALLY USED):
Subjective:       Patient ID: Stephanie Keen is a 60 y.o. female.    Chief Complaint: Follow-up (Pt here for a 6 month follow up )    Notes toenail fungus needs topical solution for this     Review of Systems   All other systems reviewed and are negative.       A1C:  Recent Labs   Lab 02/14/23  0730 08/11/23  0745 02/13/25  0703   Hemoglobin A1C 5.5 5.3 5.3     CBC:  Recent Labs   Lab 05/08/24  1843 08/16/24  0625 02/13/25  0703   WBC 3.73 L 3.89 L 3.28 L   RBC 3.71 L 3.76 L 3.71 L   Hemoglobin 11.9 L 12.0 11.9 L   Hematocrit 35.3 L 36.7 L 36.1 L   Platelets 195 169 170   MCV 95 98 97   MCH 32.1 H 31.9 H 32.1 H   MCHC 33.7 32.7 33.0     CMP:  Recent Labs   Lab 02/12/24  0653 05/08/24  1843 08/16/24  0625 02/13/25  0703   Glucose 98   < > 101 94   Calcium 9.0   < > 9.4 9.3   Albumin 4.4  --  3.6 3.7   Total Protein 8.5 H  --  7.6 7.6   Sodium 145   < > 141 141   Potassium 4.3   < > 4.5 4.3   CO2 29   < > 24 22 L   Chloride 110   < > 109 111 H   BUN 15   < > 17 15   Creatinine 1.10   < > 1.1 0.9   Alkaline Phosphatase 69  --  59 59   ALT 18  --  16 20   AST 30  --  23 24   Total Bilirubin 0.4  --  0.3 0.3    < > = values in this interval not displayed.     LIPIDS:  Recent Labs   Lab 08/11/23  0745 02/12/24  0653 08/16/24  0625 02/13/25  0703   TSH 3.300   < > 2.070  --    HDL 42  --  43 43   Cholesterol 176  --  114 L 110 L   Triglycerides 93  --  83 68   LDL Cholesterol 115.4  --  54.4 L 53.4 L   HDL/Cholesterol Ratio 23.9  --  37.7 39.1   Non-HDL Cholesterol 134  --  71 67   Total Cholesterol/HDL Ratio 4.2  --  2.7 2.6    < > = values in this interval not displayed.     TSH:  Recent Labs   Lab 08/11/23  0745 02/12/24  0653 08/16/24  0625   TSH 3.300 3.020 2.070        Objective:      Vitals:    02/20/25 1429   BP: 116/84   Pulse: 74   Temp: 97.5 °F (36.4 °C)      Physical Exam  Vitals reviewed.   Constitutional:       Appearance: Normal appearance. She is obese.   HENT:      Head: Normocephalic and atraumatic.    Eyes:      Conjunctiva/sclera: Conjunctivae normal.   Cardiovascular:      Rate and Rhythm: Normal rate and regular rhythm.      Heart sounds: Normal heart sounds.   Pulmonary:      Effort: Pulmonary effort is normal.      Breath sounds: Normal breath sounds.   Abdominal:      Palpations: Abdomen is soft.      Tenderness: There is no abdominal tenderness.   Musculoskeletal:         General: Normal range of motion.      Cervical back: Normal range of motion.      Right lower leg: No edema.      Left lower leg: No edema.   Neurological:      Mental Status: She is alert. Mental status is at baseline.   Psychiatric:         Mood and Affect: Mood normal.         Behavior: Behavior normal.          Assessment:       1. Post herpetic neuralgia    2. Sjogren's syndrome with keratoconjunctivitis sicca    3. Mixed hyperlipidemia    4. Acute deep vein thrombosis (DVT) of proximal vein of left lower extremity    5. Acquired hypothyroidism    6. Class 1 obesity due to excess calories with serious comorbidity and body mass index (BMI) of 31.0 to 31.9 in adult    7. Gastroesophageal reflux disease without esophagitis        Plan:     Problem List Items Addressed This Visit          Neuro    Post herpetic neuralgia - Primary    Overview   Slowly improving   Still gets pains from time to time   Will get vaccine ASAP             Ophtho    Sjogren's syndrome with keratoconjunctivitis sicca    Overview   Plaquenil 200  Stable   Follows with OSH rheum             Cardiac/Vascular    Mixed hyperlipidemia    Overview   Managed per cards   crestor 5  Well controlled            Hematology    Acute deep vein thrombosis (DVT) of left lower extremity    Overview   xarelto  Managed per heme  Shingles and immobility as trigger vs 2/2 autoimmune conditions and inflammation?              Endocrine    Hypothyroidism    Overview   Synthroid 88  Asymptomatic  Well controlled               Class 1 obesity due to excess calories with serious  comorbidity and body mass index (BMI) of 31.0 to 31.9 in adult    Overview   Diet and exercise advised  Lost 20 pounds  Feels well  Plateau now             GI    GERD (gastroesophageal reflux disease)    Overview   PPI daily   Needs Ca/vit D for bone health   Follows with GI OSH          Labs reviewed in detail  Problem list reviewed in detail   [place] for toenail fungus   Continue healthy lifestyle efforts  Continue current meds as prescribed otherwise; refills per request  Keep routine specialist f/u   RTC in 6 months  with labs prior and/or PRN         Sarah A. Champagne [facility]   2/20/25       Mildly to Moderately Impaired: difficulty hearing in some environments or speaker may need to increase volume or speak distinctly

## 2025-03-10 DIAGNOSIS — M85.80 OTHER SPECIFIED DISORDERS OF BONE DENSITY AND STRUCTURE, UNSPECIFIED SITE: ICD-10-CM

## 2025-03-10 DIAGNOSIS — E78.5 HYPERLIPIDEMIA, UNSPECIFIED: ICD-10-CM

## 2025-03-10 DIAGNOSIS — Z00.00 ENCOUNTER FOR GENERAL ADULT MEDICAL EXAMINATION W/OUT ABNORMAL FINDINGS: ICD-10-CM

## 2025-03-10 DIAGNOSIS — I10 ESSENTIAL (PRIMARY) HYPERTENSION: ICD-10-CM

## 2025-03-10 DIAGNOSIS — R00.2 PALPITATIONS: ICD-10-CM

## 2025-04-03 ENCOUNTER — NON-APPOINTMENT (OUTPATIENT)
Age: 75
End: 2025-04-03

## 2025-04-03 ENCOUNTER — APPOINTMENT (OUTPATIENT)
Dept: CARDIOLOGY | Facility: CLINIC | Age: 75
End: 2025-04-03
Payer: MEDICARE

## 2025-04-03 VITALS
WEIGHT: 159 LBS | OXYGEN SATURATION: 98 % | HEART RATE: 66 BPM | SYSTOLIC BLOOD PRESSURE: 120 MMHG | DIASTOLIC BLOOD PRESSURE: 70 MMHG | BODY MASS INDEX: 24.9 KG/M2

## 2025-04-03 DIAGNOSIS — I10 ESSENTIAL (PRIMARY) HYPERTENSION: ICD-10-CM

## 2025-04-03 DIAGNOSIS — R00.2 PALPITATIONS: ICD-10-CM

## 2025-04-03 DIAGNOSIS — G20.A2 PARKINSON'S DISEASE WITHOUT DYSKINESIA, WITH FLUCTUATIONS: ICD-10-CM

## 2025-04-03 DIAGNOSIS — E78.5 HYPERLIPIDEMIA, UNSPECIFIED: ICD-10-CM

## 2025-04-03 PROCEDURE — 99213 OFFICE O/P EST LOW 20 MIN: CPT

## 2025-04-03 PROCEDURE — 93000 ELECTROCARDIOGRAM COMPLETE: CPT

## 2025-04-04 LAB
ALBUMIN SERPL ELPH-MCNC: 4.7 G/DL
ALP BLD-CCNC: 104 U/L
ALT SERPL-CCNC: 18 U/L
ANION GAP SERPL CALC-SCNC: 13 MMOL/L
AST SERPL-CCNC: 22 U/L
BASOPHILS # BLD AUTO: 0.04 K/UL
BASOPHILS NFR BLD AUTO: 0.4 %
BILIRUB SERPL-MCNC: 0.7 MG/DL
BUN SERPL-MCNC: 21 MG/DL
CALCIUM SERPL-MCNC: 9.8 MG/DL
CHLORIDE SERPL-SCNC: 100 MMOL/L
CHOLEST SERPL-MCNC: 146 MG/DL
CO2 SERPL-SCNC: 24 MMOL/L
CREAT SERPL-MCNC: 0.88 MG/DL
EGFRCR SERPLBLD CKD-EPI 2021: 90 ML/MIN/1.73M2
EOSINOPHIL # BLD AUTO: 0.13 K/UL
EOSINOPHIL NFR BLD AUTO: 1.2 %
ESTIMATED AVERAGE GLUCOSE: 114 MG/DL
GLUCOSE SERPL-MCNC: 87 MG/DL
HBA1C MFR BLD HPLC: 5.6 %
HCT VFR BLD CALC: 42.3 %
HDLC SERPL-MCNC: 55 MG/DL
HGB BLD-MCNC: 13.5 G/DL
IMM GRANULOCYTES NFR BLD AUTO: 0.9 %
LDLC SERPL-MCNC: 68 MG/DL
LYMPHOCYTES # BLD AUTO: 1.6 K/UL
LYMPHOCYTES NFR BLD AUTO: 15.2 %
MAN DIFF?: NORMAL
MCHC RBC-ENTMCNC: 29.2 PG
MCHC RBC-ENTMCNC: 31.9 G/DL
MCV RBC AUTO: 91.6 FL
MONOCYTES # BLD AUTO: 0.69 K/UL
MONOCYTES NFR BLD AUTO: 6.6 %
NEUTROPHILS # BLD AUTO: 7.96 K/UL
NEUTROPHILS NFR BLD AUTO: 75.7 %
NONHDLC SERPL-MCNC: 91 MG/DL
PLATELET # BLD AUTO: 245 K/UL
POTASSIUM SERPL-SCNC: 4.9 MMOL/L
PROT SERPL-MCNC: 7.6 G/DL
PSA FREE FLD-MCNC: 29 %
PSA FREE SERPL-MCNC: 0.56 NG/ML
PSA SERPL-MCNC: 1.9 NG/ML
RBC # BLD: 4.62 M/UL
RBC # FLD: 12.8 %
SODIUM SERPL-SCNC: 137 MMOL/L
T4 FREE SERPL-MCNC: 1 NG/DL
TRIGL SERPL-MCNC: 131 MG/DL
TSH SERPL-ACNC: 2.06 UIU/ML
WBC # FLD AUTO: 10.51 K/UL

## 2025-05-23 ENCOUNTER — NON-APPOINTMENT (OUTPATIENT)
Age: 75
End: 2025-05-23

## 2025-05-23 ENCOUNTER — APPOINTMENT (OUTPATIENT)
Dept: NEUROLOGY | Facility: CLINIC | Age: 75
End: 2025-05-23
Payer: MEDICARE

## 2025-05-23 VITALS
SYSTOLIC BLOOD PRESSURE: 153 MMHG | BODY MASS INDEX: 25.11 KG/M2 | HEART RATE: 61 BPM | DIASTOLIC BLOOD PRESSURE: 99 MMHG | WEIGHT: 160 LBS | HEIGHT: 67 IN

## 2025-05-23 PROCEDURE — 99214 OFFICE O/P EST MOD 30 MIN: CPT

## 2025-07-26 NOTE — PHYSICAL EXAM
[TextEntry] : General/Constitutional: WD/ WN in NAD H: NC/AT Eyes:  PERRL, sclerae and conjunctivae normal without jaundice or xanthelasma; ENMT:normal teeth, gums and palate with no petechiae, pallor or cyanosis Neck: w/o JVD, thyromegaly or adenopathy; normal venous contour Respiratory: clear to auscultation, normal respiratory effort with no retractions or use of accessory respiratory muscles Heart: [id number], regular rate, normal S1, S2 without murmurs, rubs, gallops, heaves or thrills Vascular exam: normal carotid upstrokes without carotid or abdominal bruits. 2+/2+ pulses to posterior tibialis and dorsalis pedis Abdomen: soft, nontender, bowels sounds normal without hepatomegaly or splenomegaly, masses or bruits Musculoskeletal:without significant kyphosis or scoliosis Extremities: w/o CCE, good capillary filling Skin: no stasis changes; no ulcers  Neuro: AA and O x 3; masked facies consistent with Parkinson's disease and intermittent mild tremor Psych: normal mood and affect
No